# Patient Record
Sex: MALE | Race: WHITE | NOT HISPANIC OR LATINO | Employment: FULL TIME | URBAN - METROPOLITAN AREA
[De-identification: names, ages, dates, MRNs, and addresses within clinical notes are randomized per-mention and may not be internally consistent; named-entity substitution may affect disease eponyms.]

---

## 2020-12-04 ENCOUNTER — OFFICE VISIT (OUTPATIENT)
Dept: URGENT CARE | Facility: CLINIC | Age: 45
End: 2020-12-04
Payer: COMMERCIAL

## 2020-12-04 VITALS
DIASTOLIC BLOOD PRESSURE: 79 MMHG | SYSTOLIC BLOOD PRESSURE: 117 MMHG | BODY MASS INDEX: 22.53 KG/M2 | OXYGEN SATURATION: 96 % | WEIGHT: 185 LBS | HEART RATE: 66 BPM | HEIGHT: 76 IN | TEMPERATURE: 98 F | RESPIRATION RATE: 16 BRPM

## 2020-12-04 DIAGNOSIS — R09.81 SINUS CONGESTION: Primary | ICD-10-CM

## 2020-12-04 LAB
CTP QC/QA: YES
SARS-COV-2 RDRP RESP QL NAA+PROBE: NEGATIVE

## 2020-12-04 PROCEDURE — U0002: ICD-10-PCS | Mod: QW,S$GLB,, | Performed by: PHYSICIAN ASSISTANT

## 2020-12-04 PROCEDURE — 3008F BODY MASS INDEX DOCD: CPT | Mod: CPTII,S$GLB,, | Performed by: PHYSICIAN ASSISTANT

## 2020-12-04 PROCEDURE — U0002 COVID-19 LAB TEST NON-CDC: HCPCS | Mod: QW,S$GLB,, | Performed by: PHYSICIAN ASSISTANT

## 2020-12-04 PROCEDURE — 3008F PR BODY MASS INDEX (BMI) DOCUMENTED: ICD-10-PCS | Mod: CPTII,S$GLB,, | Performed by: PHYSICIAN ASSISTANT

## 2020-12-04 PROCEDURE — 99203 PR OFFICE/OUTPT VISIT, NEW, LEVL III, 30-44 MIN: ICD-10-PCS | Mod: S$GLB,,, | Performed by: PHYSICIAN ASSISTANT

## 2020-12-04 PROCEDURE — 99203 OFFICE O/P NEW LOW 30 MIN: CPT | Mod: S$GLB,,, | Performed by: PHYSICIAN ASSISTANT

## 2020-12-04 RX ORDER — ADALIMUMAB 40MG/0.4ML
KIT SUBCUTANEOUS
COMMUNITY
Start: 2020-11-16

## 2020-12-04 RX ORDER — FLUTICASONE PROPIONATE 50 MCG
1 SPRAY, SUSPENSION (ML) NASAL 2 TIMES DAILY
Qty: 16 G | Refills: 0 | Status: SHIPPED | OUTPATIENT
Start: 2020-12-04

## 2020-12-04 RX ORDER — AZELASTINE 1 MG/ML
1 SPRAY, METERED NASAL 2 TIMES DAILY
Qty: 30 ML | Refills: 0 | Status: SHIPPED | OUTPATIENT
Start: 2020-12-04 | End: 2021-12-04

## 2020-12-04 NOTE — PATIENT INSTRUCTIONS
"Your test was NEGATIVE for COVID-19 (coronavirus).      You may leave home and/or return to work when the following conditions are met:   24 hours fever free without fever-reducing medications AND   Improved symptoms   You have not met the conditions of a closed exposure       A "close exposure" is defined as anyone who has had an exposure (masked or unmasked) to a known COVID -19 positive person within 6 ft for longer than 15 minutes. If your exposure meets this definition you are required by CDC guidelines to quarantine for 14 days from time of exposure regardless of test status.      Additional instructions:  · Social distance per your local guidelines  · Call ahead before visiting your doctor.  · Wear a facemask when around others who do not live in your household.  · Cover your coughs and sneezes.  · Wash your hands often with soap and water; hand  can be used, too.      If your symptoms worsen or if you have any other concerns, please contact [facility] at [phone].     Sincerely,    KAIDEN Lagos  "

## 2020-12-04 NOTE — PROGRESS NOTES
"Subjective:       Patient ID: Todd Azevedo is a 45 y.o. male.    Vitals:  height is 6' 4" (1.93 m) and weight is 83.9 kg (185 lb). His temperature is 97.7 °F (36.5 °C). His blood pressure is 117/79 and his pulse is 66. His respiration is 16 and oxygen saturation is 96%.     Chief Complaint: COVID-19 Concerns    Patient reports sinus congestion and cough x 5 days.    Sinus Problem  This is a new problem. The current episode started in the past 7 days. The problem is unchanged. There has been no fever. Associated symptoms include congestion, coughing and sinus pressure. Pertinent negatives include no chills, headaches, shortness of breath, sneezing or sore throat. Past treatments include oral decongestants. The treatment provided mild relief.       Constitution: Negative for chills, fatigue and fever.   HENT: Positive for congestion and sinus pressure. Negative for sore throat.    Neck: Negative for painful lymph nodes.   Cardiovascular: Negative for chest pain and leg swelling.   Eyes: Negative for double vision and blurred vision.   Respiratory: Positive for cough. Negative for shortness of breath.    Gastrointestinal: Negative for nausea, vomiting and diarrhea.   Genitourinary: Negative for dysuria, frequency and urgency.   Musculoskeletal: Negative for joint pain, joint swelling, muscle cramps and muscle ache.   Skin: Negative for color change, pale and rash.   Allergic/Immunologic: Negative for seasonal allergies and sneezing.   Neurological: Negative for dizziness, history of vertigo, light-headedness, passing out and headaches.   Hematologic/Lymphatic: Negative for swollen lymph nodes, easy bruising/bleeding and history of blood clots. Does not bruise/bleed easily.   Psychiatric/Behavioral: Negative for nervous/anxious, sleep disturbance and depression. The patient is not nervous/anxious.        Objective:      Physical Exam   Constitutional: He is oriented to person, place, and time. He appears " well-developed. He is cooperative.  Non-toxic appearance. He does not appear ill. No distress.   HENT:   Head: Normocephalic and atraumatic.   Ears:   Right Ear: Hearing and external ear normal.   Left Ear: Hearing and external ear normal.   Nose: Nose normal. No mucosal edema, rhinorrhea or nasal deformity. No epistaxis. Right sinus exhibits no maxillary sinus tenderness and no frontal sinus tenderness. Left sinus exhibits no maxillary sinus tenderness and no frontal sinus tenderness.   Mouth/Throat: Uvula is midline, oropharynx is clear and moist and mucous membranes are normal. Mucous membranes are moist. No trismus in the jaw. Normal dentition. No uvula swelling. No oropharyngeal exudate, posterior oropharyngeal edema or posterior oropharyngeal erythema. Oropharynx is clear.   Eyes: Conjunctivae and lids are normal. No scleral icterus.   Neck: Trachea normal, full passive range of motion without pain and phonation normal. Neck supple. No neck rigidity. No edema and no erythema present.   Cardiovascular: Normal rate, regular rhythm, normal heart sounds and normal pulses.   Pulmonary/Chest: Effort normal and breath sounds normal. No respiratory distress. He has no decreased breath sounds. He has no wheezes. He has no rhonchi. He has no rales.   Abdominal: Normal appearance.   Musculoskeletal: Normal range of motion.         General: No deformity.   Neurological: He is alert and oriented to person, place, and time. He exhibits normal muscle tone. Coordination normal.   Skin: Skin is warm, dry, intact, not diaphoretic and not pale. Psychiatric: His speech is normal and behavior is normal. Mood, judgment and thought content normal.   Nursing note and vitals reviewed.        Assessment:       1. Sinus congestion        Plan:         Sinus congestion  -     POCT COVID-19 Rapid Screening    Results for orders placed or performed in visit on 12/04/20   POCT COVID-19 Rapid Screening   Result Value Ref Range    POC Rapid  "COVID Negative Negative     Acceptable Yes        Other orders  -     azelastine (ASTELIN) 137 mcg (0.1 %) nasal spray; 1 spray (137 mcg total) by Nasal route 2 (two) times daily.  Dispense: 30 mL; Refill: 0  -     fluticasone propionate (FLONASE ALLERGY RELIEF) 50 mcg/actuation nasal spray; 1 spray (50 mcg total) by Each Nostril route 2 (two) times daily.  Dispense: 16 g; Refill: 0    Patient Instructions   Your test was NEGATIVE for COVID-19 (coronavirus).      You may leave home and/or return to work when the following conditions are met:   24 hours fever free without fever-reducing medications AND   Improved symptoms   You have not met the conditions of a closed exposure       A "close exposure" is defined as anyone who has had an exposure (masked or unmasked) to a known COVID -19 positive person within 6 ft for longer than 15 minutes. If your exposure meets this definition you are required by CDC guidelines to quarantine for 14 days from time of exposure regardless of test status.      Additional instructions:  · Social distance per your local guidelines  · Call ahead before visiting your doctor.  · Wear a facemask when around others who do not live in your household.  · Cover your coughs and sneezes.  · Wash your hands often with soap and water; hand  can be used, too.      If your symptoms worsen or if you have any other concerns, please contact [facility] at [phone].     Sincerely,    KAIDEN Lagos         "

## 2024-12-07 ENCOUNTER — OFFICE VISIT (OUTPATIENT)
Dept: URGENT CARE | Facility: CLINIC | Age: 49
End: 2024-12-07
Payer: COMMERCIAL

## 2024-12-07 VITALS
BODY MASS INDEX: 22.53 KG/M2 | SYSTOLIC BLOOD PRESSURE: 117 MMHG | DIASTOLIC BLOOD PRESSURE: 90 MMHG | TEMPERATURE: 98 F | HEART RATE: 73 BPM | WEIGHT: 185 LBS | HEIGHT: 76 IN | OXYGEN SATURATION: 98 %

## 2024-12-07 DIAGNOSIS — R05.9 COUGH, UNSPECIFIED TYPE: ICD-10-CM

## 2024-12-07 DIAGNOSIS — J10.1 INFLUENZA A: Primary | ICD-10-CM

## 2024-12-07 LAB
CTP QC/QA: YES
CTP QC/QA: YES
MOLECULAR STREP A: NEGATIVE
POC MOLECULAR INFLUENZA A AGN: POSITIVE
POC MOLECULAR INFLUENZA B AGN: NEGATIVE

## 2024-12-07 PROCEDURE — 87502 INFLUENZA DNA AMP PROBE: CPT | Mod: QW,S$GLB,, | Performed by: PHYSICIAN ASSISTANT

## 2024-12-07 PROCEDURE — 87651 STREP A DNA AMP PROBE: CPT | Mod: QW,S$GLB,, | Performed by: PHYSICIAN ASSISTANT

## 2024-12-07 PROCEDURE — 99203 OFFICE O/P NEW LOW 30 MIN: CPT | Mod: S$GLB,,, | Performed by: PHYSICIAN ASSISTANT

## 2024-12-07 RX ORDER — PROMETHAZINE HYDROCHLORIDE 6.25 MG/5ML
SYRUP ORAL
Qty: 120 ML | Refills: 1 | Status: SHIPPED | OUTPATIENT
Start: 2024-12-07

## 2024-12-07 RX ORDER — BALOXAVIR MARBOXIL 80 MG/1
80 TABLET, FILM COATED ORAL ONCE
Qty: 1 TABLET | Refills: 0 | Status: SHIPPED | OUTPATIENT
Start: 2024-12-07 | End: 2024-12-07

## 2024-12-07 RX ORDER — UPADACITINIB 30 MG/1
30 TABLET, EXTENDED RELEASE ORAL DAILY
COMMUNITY
Start: 2024-11-13

## 2024-12-07 NOTE — PROGRESS NOTES
"Subjective:      Patient ID: Todd Azevedo is a 49 y.o. male.    Vitals:  height is 6' 4" (1.93 m) and weight is 83.9 kg (185 lb). His oral temperature is 98.3 °F (36.8 °C). His blood pressure is 117/90 (abnormal) and his pulse is 73. His oxygen saturation is 98%.     Chief Complaint: Cough    Pt presents today with c/o cough x's 5 days. Pt has not taken anything for symptoms. Pt has been exposed to flu and strep. Pain level 0/10.    Cough  This is a new problem. The current episode started in the past 7 days. The problem has been unchanged. The cough is Non-productive. Associated symptoms include a fever, myalgias, postnasal drip, rhinorrhea, a sore throat and wheezing. Pertinent negatives include no chest pain, chills, ear pain, eye redness, headaches, heartburn, hemoptysis, rash or shortness of breath. Nothing aggravates the symptoms. He has tried nothing for the symptoms. The treatment provided moderate relief. His past medical history is significant for pneumonia. There is no history of asthma, bronchiectasis, bronchitis, COPD, emphysema or environmental allergies.       Constitution: Positive for fever. Negative for chills, sweating and fatigue.   HENT:  Positive for congestion, postnasal drip and sore throat. Negative for ear pain, drooling, trouble swallowing and voice change.    Neck: Negative for neck pain, neck stiffness and painful lymph nodes.   Cardiovascular:  Negative for chest pain, leg swelling, palpitations, sob on exertion and passing out.   Eyes:  Negative for eye discharge, eye itching, eye pain, eye redness and eyelid swelling.   Respiratory:  Positive for cough and wheezing. Negative for chest tightness, sputum production, bloody sputum, shortness of breath and stridor.    Gastrointestinal:  Negative for abdominal pain, abdominal bloating, nausea, vomiting, constipation, diarrhea and heartburn.   Genitourinary:  Negative for urine decreased.   Musculoskeletal:  Positive for muscle ache. " Negative for joint pain, joint swelling, abnormal ROM of joint, pain with walking and muscle cramps.   Skin:  Negative for rash and hives.   Allergic/Immunologic: Negative for environmental allergies, hives, itching and sneezing.   Neurological:  Negative for dizziness, light-headedness, passing out, loss of balance, headaches, altered mental status, loss of consciousness, numbness and seizures.   Hematologic/Lymphatic: Negative for swollen lymph nodes.   Psychiatric/Behavioral:  Negative for altered mental status and nervous/anxious. The patient is not nervous/anxious.       Objective:     Physical Exam   Constitutional: He is oriented to person, place, and time. He appears well-developed. He is cooperative.  Non-toxic appearance. He does not appear ill. No distress.   HENT:   Head: Normocephalic and atraumatic.   Ears:   Right Ear: Hearing, tympanic membrane, external ear and ear canal normal.   Left Ear: Hearing, tympanic membrane, external ear and ear canal normal.   Nose: Mucosal edema and rhinorrhea present. No nasal deformity. No epistaxis. Right sinus exhibits no maxillary sinus tenderness and no frontal sinus tenderness. Left sinus exhibits no maxillary sinus tenderness and no frontal sinus tenderness.   Mouth/Throat: Uvula is midline and mucous membranes are normal. No trismus in the jaw. Normal dentition. No uvula swelling. Posterior oropharyngeal erythema and cobblestoning present. No oropharyngeal exudate, posterior oropharyngeal edema or tonsillar abscesses. No tonsillar exudate.   Eyes: Conjunctivae and lids are normal. No scleral icterus.   Neck: Trachea normal and phonation normal. Neck supple. No edema present. No erythema present. No neck rigidity present.   Cardiovascular: Normal rate, regular rhythm, normal heart sounds and normal pulses.   Pulmonary/Chest: Effort normal and breath sounds normal. No accessory muscle usage or stridor. No respiratory distress. He has no decreased breath sounds. He  has no wheezes. He has no rhonchi. He has no rales.   Abdominal: Normal appearance.   Musculoskeletal: Normal range of motion.         General: No deformity. Normal range of motion.   Lymphadenopathy:     He has no cervical adenopathy.   Neurological: He is alert and oriented to person, place, and time. He has normal sensation. He exhibits normal muscle tone. Gait normal. Coordination normal.   Skin: Skin is warm, dry, intact, not diaphoretic, not pale and no rash. Capillary refill takes less than 2 seconds.   Psychiatric: His speech is normal and behavior is normal. Judgment and thought content normal.   Nursing note and vitals reviewed.    Results for orders placed or performed in visit on 12/07/24   POCT Influenza A/B MOLECULAR    Collection Time: 12/07/24  2:57 PM   Result Value Ref Range    POC Molecular Influenza A Ag Positive (A) Negative    POC Molecular Influenza B Ag Negative Negative     Acceptable Yes    POCT Strep A, Molecular    Collection Time: 12/07/24  3:11 PM   Result Value Ref Range    Molecular Strep A, POC Negative Negative     Acceptable Yes        Assessment:     1. Influenza A    2. Cough, unspecified type        Plan:       Influenza A    Cough, unspecified type  -     POCT Strep A, Molecular  -     POCT Influenza A/B MOLECULAR    Other orders  -     baloxavir marboxiL (XOFLUZA) 80 mg tablet; Take 1 tablet (80 mg total) by mouth once. for 1 dose  Dispense: 1 tablet; Refill: 0  -     naproxen-pseudoephedrine 220-120 mg Tb12; Take 1 tablet by mouth once daily. for 10 days  Dispense: 12 each; Refill: 0  -     promethazine (PHENERGAN) 6.25 mg/5 mL syrup; Take 10 mLs at night as needed.  Dispense: 120 mL; Refill: 1      INSTRUCTIONS:  - Rest.  - Drink plenty of fluids.  - Take Tylenol and/or Ibuprofen as directed as needed for fever/pain.  Do not take more than the recommended dose.  - follow up with your PCP within the next 1-2 weeks as needed.  - You must  understand that you have received an Urgent Care treatment only and that you may be released before all of your medical problems are known or treated.   - You, the patient, will arrange for follow up care as instructed.   - If your condition worsens or fails to improve we recommend that you receive another evaluation at the ER immediately or contact your PCP to discuss your concerns.   - You can call [phone] or [phone] to help schedule an appointment with the appropriate provider.     -If you smoke cigarettes, it would be beneficial for you to stop.

## 2025-08-24 ENCOUNTER — OFFICE VISIT (OUTPATIENT)
Dept: URGENT CARE | Facility: CLINIC | Age: 50
End: 2025-08-24
Payer: COMMERCIAL

## 2025-08-24 VITALS
OXYGEN SATURATION: 98 % | SYSTOLIC BLOOD PRESSURE: 134 MMHG | BODY MASS INDEX: 22.53 KG/M2 | HEART RATE: 51 BPM | HEIGHT: 76 IN | RESPIRATION RATE: 18 BRPM | DIASTOLIC BLOOD PRESSURE: 86 MMHG | WEIGHT: 185 LBS | TEMPERATURE: 98 F

## 2025-08-24 DIAGNOSIS — J02.9 SORE THROAT: Primary | ICD-10-CM

## 2025-08-24 LAB
CTP QC/QA: YES
MOLECULAR STREP A: NEGATIVE

## 2025-08-24 PROCEDURE — 99214 OFFICE O/P EST MOD 30 MIN: CPT | Mod: S$GLB,,, | Performed by: PHYSICIAN ASSISTANT

## 2025-08-24 PROCEDURE — 87651 STREP A DNA AMP PROBE: CPT | Mod: QW,S$GLB,, | Performed by: PHYSICIAN ASSISTANT

## 2025-08-24 RX ORDER — PREDNISONE 20 MG/1
TABLET ORAL
Qty: 10 TABLET | Refills: 0 | Status: SHIPPED | OUTPATIENT
Start: 2025-08-24